# Patient Record
Sex: FEMALE | Race: WHITE | NOT HISPANIC OR LATINO | Employment: FULL TIME | URBAN - METROPOLITAN AREA
[De-identification: names, ages, dates, MRNs, and addresses within clinical notes are randomized per-mention and may not be internally consistent; named-entity substitution may affect disease eponyms.]

---

## 2023-05-08 ENCOUNTER — EVALUATION (OUTPATIENT)
Dept: PHYSICAL THERAPY | Facility: CLINIC | Age: 58
End: 2023-05-08

## 2023-05-08 DIAGNOSIS — M17.11 PRIMARY OSTEOARTHRITIS OF RIGHT KNEE: Primary | ICD-10-CM

## 2023-05-08 NOTE — PROGRESS NOTES
PT Evaluation     Today's date: 2023  Patient name: Danuta Warner  : 1965  MRN: [id number]  Referring provider: Janet Barney MD  Dx:   Encounter Diagnosis     ICD-10-CM    1  Primary osteoarthritis of right knee  M17 11                Assessment  Assessment details: Danuta Warner is a 61 y o  female who presents with pain, decreased strength, decreased ROM, joint effusion, ambulatory dysfunction and balance dysfunction  Due to these impairments, patient has difficulty performing ADL's, recreational activities, work-related activities, engaging in social activities, ambulation, stair negotiation, lifting/carrying, transfers  Patient's clinical presentation is consistent with their referring diagnosis of Primary osteoarthritis of right knee  (primary encounter diagnosis)  Patient has been educated in post-op contraindications / precautions, gait training, weight bearing status, home exercise program and plan of care  Patient would benefit from skilled physical therapy services to address their aforementioned functional limitations and progress towards prior level of function and independence with home exercise program      Impairments: abnormal gait, abnormal or restricted ROM, activity intolerance, impaired balance, impaired physical strength, lacks appropriate home exercise program, pain with function, weight-bearing intolerance, poor posture  and poor body mechanics  Functional limitations: walk/stand, STS, stair negotiation, squat, [place], sitting for work/driving   Prognosis: good  Prognosis details: + factors: high motivation levels  - factors: chronic pain    Goals  Short Term Goals to be accomplished by 23:  STG1: Pt will demonstrate 5/5 strength in R knee to maximize strength prior to surgery            Plan  Plan details: HEP development, stretching, strengthening, A/AA/PROM, joint mobilizations, posture education, STM/MI as needed to reduce muscle tension, muscle reeducation, PLOC discussed and agreed upon with patient  Patient would benefit from: PT eval and skilled physical therapy  Planned modality interventions: cryotherapy, thermotherapy: hydrocollator packs and unattended electrical stimulation  Planned therapy interventions: manual therapy, neuromuscular re-education, self care, therapeutic activities, therapeutic exercise, home exercise program, patient education, joint mobilization, balance, strengthening, stretching and therapeutic training  Frequency: 2x week  Duration in weeks: 12  Plan of Care beginning date: 2023  Plan of Care expiration date: 2023  Treatment plan discussed with: patient        Subjective Evaluation    History of Present Illness  Mechanism of injury: Pt is a 60 y/o female who presents to PT for pre op R TKA to be performed on 23 with Dr José Antnoio Huizar  She has had chronic R knee pain, however this time last year the pain started getting significantly worse  Has tried series of injections of all types  Currently taking Celebrex due to pain  Recreation: [place]  Work: prolonged sit, 45 min drive commute  Pain  Current pain rating: 3  At best pain rating: 3  At worst pain ratin  Location: R knee   Quality: discomfort, knife-like, sharp, throbbing, tight, pressure and grinding  Relieving factors: relaxation and rest  Aggravating factors: standing, walking, stair climbing, lifting and sitting    Treatments  Current treatment: physical therapy  Patient Goals  Patient goals for therapy: increased motion, improved balance, decreased pain, decreased edema, increased strength, independence with ADLs/IADLs, return to sport/leisure activities and return to work  Patient goal: walk/stand, STS, stair negotiation, squat, [place], sitting for work/driving        Objective     Observations     Right Knee   Positive for edema       Tenderness     Additional Tenderness Details  TTP over R ITB, adductors, HS    Active Range of Motion Left Knee   Flexion: 125 degrees   Extension: 0 degrees     Right Knee   Flexion: 110 degrees with pain  Extension: 0 degrees with pain    Passive Range of Motion   Left Knee   Flexion: 125 degrees   Extension: 0 degrees     Right Knee   Flexion: 115 degrees with pain  Extension: 0 degrees with pain    Strength/Myotome Testing     Left Knee   Flexion: 5  Extension: 5  Quadriceps contraction: good    Right Knee   Flexion: 4-  Extension: 4-  Quadriceps contraction: fair    Additional Strength Details  U/l heel raise:  R: 10 reps  L: 20 reps     Tests     Right Knee   Negative valgus stress test at 30 degrees and varus stress test at 30 degrees       Additional Tests Details  SLS: NOT TESTED   R: sec  L: sec         Precautions:   Severe R knee OA      Manuals 5/8        STM add, ITB, HS, quad, calf         PROM stretch                            Neuro Re-Ed 5/8                                                                       Ther Ex 5/8        SLR 2x10        Bridge 2x10        S/l hip abd 2x10        U/l heel raise 2x10                                             Ther Activity 5/8

## 2023-05-08 NOTE — LETTER
May 8, 2023    Rosamaria Davenport MD  [address] [place] [address]    Patient: Betsy Hartmann   YOB: 1965   Date of Visit: 2023     Encounter Diagnosis     ICD-10-CM    1  Primary osteoarthritis of right knee  M17 11           Dear Dr Rudy Thompson: Thank you for your recent referral of Betsy Hartmann  Please review the attached evaluation summary from Jessica's recent visit  Please verify that you agree with the plan of care by signing the attached order  If you have any questions or concerns, please do not hesitate to call  I sincerely appreciate the opportunity to share in the care of one of your patients and hope to have another opportunity to work with you in the near future  Sincerely,    Terry Woodard, PT      Referring Provider:      I certify that I have read the below Plan of Care and certify the need for these services furnished under this plan of treatment while under my care  Rosamaria Davenport MD  [address] [place] [address],[address]  Via Fax: [phone]          PT Evaluation     Today's date: 2023  Patient name: Betsy Hartmann  : 1965  MRN: [id number]  Referring provider: Robin Bowser MD  Dx:   Encounter Diagnosis     ICD-10-CM    1  Primary osteoarthritis of right knee  M17 11                Assessment  Assessment details: Betsy Hartmann is a 61 y o  female who presents with pain, decreased strength, decreased ROM, joint effusion, ambulatory dysfunction and balance dysfunction  Due to these impairments, patient has difficulty performing ADL's, recreational activities, work-related activities, engaging in social activities, ambulation, stair negotiation, lifting/carrying, transfers  Patient's clinical presentation is consistent with their referring diagnosis of Primary osteoarthritis of right knee  (primary encounter diagnosis)   Patient has been educated in post-op contraindications / precautions, gait training, weight bearing status, home exercise program and plan of care  Patient would benefit from skilled physical therapy services to address their aforementioned functional limitations and progress towards prior level of function and independence with home exercise program      Impairments: abnormal gait, abnormal or restricted ROM, activity intolerance, impaired balance, impaired physical strength, lacks appropriate home exercise program, pain with function, weight-bearing intolerance, poor posture  and poor body mechanics  Functional limitations: walk/stand, STS, stair negotiation, squat, [place], sitting for work/driving   Prognosis: good  Prognosis details: + factors: high motivation levels  - factors: chronic pain    Goals  Short Term Goals to be accomplished by 6/7/23:  STG1: Pt will demonstrate 5/5 strength in R knee to maximize strength prior to surgery  Plan  Plan details: HEP development, stretching, strengthening, A/AA/PROM, joint mobilizations, posture education, STM/MI as needed to reduce muscle tension, muscle reeducation, PLOC discussed and agreed upon with patient  Patient would benefit from: PT eval and skilled physical therapy  Planned modality interventions: cryotherapy, thermotherapy: hydrocollator packs and unattended electrical stimulation  Planned therapy interventions: manual therapy, neuromuscular re-education, self care, therapeutic activities, therapeutic exercise, home exercise program, patient education, joint mobilization, balance, strengthening, stretching and therapeutic training  Frequency: 2x week  Duration in weeks: 12  Plan of Care beginning date: 5/8/2023  Plan of Care expiration date: 7/31/2023  Treatment plan discussed with: patient        Subjective Evaluation    History of Present Illness  Mechanism of injury: Pt is a 60 y/o female who presents to PT for pre op R TKA to be performed on 6/6/23 with Dr Daniella Uriarte   She has had chronic R knee pain, however this time last year the pain started getting significantly worse  Has tried series of injections of all types  Currently taking Celebrex due to pain  Recreation: [place]  Work: prolonged sit, 45 min drive commute  Pain  Current pain rating: 3  At best pain rating: 3  At worst pain ratin  Location: R knee   Quality: discomfort, knife-like, sharp, throbbing, tight, pressure and grinding  Relieving factors: relaxation and rest  Aggravating factors: standing, walking, stair climbing, lifting and sitting    Treatments  Current treatment: physical therapy  Patient Goals  Patient goals for therapy: increased motion, improved balance, decreased pain, decreased edema, increased strength, independence with ADLs/IADLs, return to sport/leisure activities and return to work  Patient goal: walk/stand, STS, stair negotiation, squat, [place], sitting for work/driving        Objective     Observations     Right Knee   Positive for edema  Tenderness     Additional Tenderness Details  TTP over R ITB, adductors, HS    Active Range of Motion   Left Knee   Flexion: 125 degrees   Extension: 0 degrees     Right Knee   Flexion: 110 degrees with pain  Extension: 0 degrees with pain    Passive Range of Motion   Left Knee   Flexion: 125 degrees   Extension: 0 degrees     Right Knee   Flexion: 115 degrees with pain  Extension: 0 degrees with pain    Strength/Myotome Testing     Left Knee   Flexion: 5  Extension: 5  Quadriceps contraction: good    Right Knee   Flexion: 4-  Extension: 4-  Quadriceps contraction: fair    Additional Strength Details  U/l heel raise:  R: 10 reps  L: 20 reps     Tests     Right Knee   Negative valgus stress test at 30 degrees and varus stress test at 30 degrees       Additional Tests Details  SLS: NOT TESTED   R: sec  L: sec        Precautions:   Severe R knee OA      Manuals 8        STM add, ITB, HS, quad, calf         PROM stretch Neuro Re-Ed 5/8                                                                       Ther Ex 5/8        SLR 2x10        Bridge 2x10        S/l hip abd 2x10        U/l heel raise 2x10                                             Ther Activity 5/8

## 2023-06-08 ENCOUNTER — EVALUATION (OUTPATIENT)
Dept: PHYSICAL THERAPY | Facility: CLINIC | Age: 58
End: 2023-06-08
Payer: COMMERCIAL

## 2023-06-08 DIAGNOSIS — G89.29 CHRONIC PAIN OF RIGHT KNEE: Primary | ICD-10-CM

## 2023-06-08 DIAGNOSIS — R26.2 DIFFICULTY WALKING: ICD-10-CM

## 2023-06-08 DIAGNOSIS — M25.561 CHRONIC PAIN OF RIGHT KNEE: Primary | ICD-10-CM

## 2023-06-08 DIAGNOSIS — Z96.651 STATUS POST RIGHT KNEE REPLACEMENT: ICD-10-CM

## 2023-06-08 DIAGNOSIS — M17.11 OSTEOARTHRITIS OF RIGHT KNEE, UNSPECIFIED OSTEOARTHRITIS TYPE: ICD-10-CM

## 2023-06-08 PROCEDURE — 97110 THERAPEUTIC EXERCISES: CPT | Performed by: PHYSICAL THERAPIST

## 2023-06-08 PROCEDURE — 97161 PT EVAL LOW COMPLEX 20 MIN: CPT | Performed by: PHYSICAL THERAPIST

## 2023-06-08 NOTE — LETTER
2023    Rory De La Torre MD  [address] [place] [address]    Patient: Maday Ivy   YOB: 1965   Date of Visit: 2023     Encounter Diagnosis     ICD-10-CM    1  Chronic pain of right knee  M25 561     G89 29       2  Osteoarthritis of right knee, unspecified osteoarthritis type  M17 11       3  Status post right knee replacement  Z96 651       4  Difficulty walking  R26 2           Dear Dr Bazan Nim: Thank you for your recent referral of Maday Ivy  Please review the attached evaluation summary from Jessica's recent visit  Please verify that you agree with the plan of care by signing the attached order  If you have any questions or concerns, please do not hesitate to call  I sincerely appreciate the opportunity to share in the care of one of your patients and hope to have another opportunity to work with you in the near future  Sincerely,    Giovanni Nguyen, PT      Referring Provider:      I certify that I have read the below Plan of Care and certify the need for these services furnished under this plan of treatment while under my care  Rory De La Torre MD  [address] [place] [address]  Via Fax: [phone]          PT Evaluation     Today's date: 2023  Patient name: Maday Ivy  : 1965  MRN: [id number]  Referring provider: Barb Purvis MD  Dx:   Encounter Diagnosis     ICD-10-CM    1  Chronic pain of right knee  M25 561     G89 29       2  Osteoarthritis of right knee, unspecified osteoarthritis type  M17 11       3  Status post right knee replacement  Z96 651       4  Difficulty walking  R26 2           Start Time: 1130  Stop Time: 1215  Total time in clinic (min): 45 minutes    Assessment/Plan     Maday Ivy is a 61 y o  female who was referred to physical therapy for management of pain/dysfunction s/p R TKA    She presents with expected post-surgical deficits including poor quad activation, decreased knee ROM, and report of pain  Consequently, patient has difficulty completing all WBing ADLs  Barb Scott would benefit from skilled intervention to address all deficits and improve functional capability  Patient is a good candidate for therapy, pending compliance with HEP and 2x weekly participation  Thank you for the referral and please do not hesitate to contact me with any questions or concerns regarding Jessica’s care! Plan  Frequency: 2x per week    Duration in weeks: 12  POC start date: 6/8/23  POC end date: 8/31/23   Therapeutic exercise/activity, neuromuscular reeducation, manual therapy, and modalities  Patient understands and agrees to plan of care  Goals  Short Term--4 weeks  1  2 point decrease in pain levels  2  Patient will demonstrate a palpable quad contraction with observable superior patellar translation and minimal to no gluteal compression  3  Knee ROM 0-120  4  Patient will be independent with HEP  Long Term--By Discharge  1  Patient will achieve expected FOTO score  2  Patient will demonstrate normalized gait pattern with no ambulatory limitation  3  Patient will safely navigate flight of steps utilizing reciprocal step pattern and no use of HHA  4  LE strength > 4+/5 in all planes    Patient's Goal: Be able to ride her bike outside        Subjective     History   Date of Surgery: 6/6/23 Description: R TKA  Follow up with surgeon on 6/23/23  Symptoms  Generalized achiness and stiffness in both anterior/posterior knee and quad muscle  Patient is currently taking prescription opioid, Celebrex, and OTC tylenol  Pain at best: 4  Pain at worst: [address] lives with her  in a home with stairs to enter and a basement  Patient is a  for [employer]  Patient works from home 2 days per week and is in the office for 3 days  Commute is 1 5 hours round trip        Objective  Flowsheet Rows Flowsheet Row Most Recent Value   PT/OT G-Codes    Current Score 40   Projected Score 62         GAIT: Step-through gait pattern with use of RW    Squat assess: TBA  SLS: RLE: TBA, LLE: TBA    Knee A/PROM:  Right = 0-15-76    0-10-81       Left =  10-0-133    Quad activation = fair, observable contraction with min glute compensation           MMT    Hip         R          L   Flex  Extn  Abd  MMT    Knee      R          L   Flex  Extn  MMT    Ankle         R          L   PF     DF  Incision inspection = dressing covering entire incision present and intact; no seepage present; patient denies constitutional symptoms          Precautions: IBS  Access Code: [email or web address]  URL: [email or web address]  Date: 06/08/2023  Prepared by: Halle Portillo    Exercises  - Seated Heel Slide  - 5 x daily - 10 reps - 10 sec hold  - Supine Heel Slide with Strap  - 5 x daily - 10 reps - 10 sec hold  - Seated Quad Set  - 5 x daily - 2 sets - 10 reps - 5 sec hold  - Supine Quad Set  - 5 x daily - 2 sets - 10 reps - 5 sec hold  - Seated Long Arc Quad  - 1 x daily - 2 sets - 10 reps - 5 hold      Manuals 6/8            Knee PROM JAB            Patellar mobs                                       Neuro Re-Ed             Quad sets             TKE                                                                              Ther Ex             HEP 8'            Bike AAROM             Heel slides             gastroc stretch c strap                          Standing flex stretch on step             Mini squats             HR             Ther Activity             Ant step ups             Ant step downs             Leg Press                          Gait Training             SPC amb                          Modalities             Ice post 10'

## 2023-06-08 NOTE — PROGRESS NOTES
PT Evaluation     Today's date: 2023  Patient name: Gonzalez Jimenez  : 1965  MRN: [id number]  Referring provider: Avril Miller MD  Dx:   Encounter Diagnosis     ICD-10-CM    1  Chronic pain of right knee  M25 561     G89 29       2  Osteoarthritis of right knee, unspecified osteoarthritis type  M17 11       3  Status post right knee replacement  Z96 651       4  Difficulty walking  R26 2           Start Time: 1130  Stop Time: 1215  Total time in clinic (min): 45 minutes    Assessment/Plan     Gonzalez Jimenez is a 61 y o  female who was referred to physical therapy for management of pain/dysfunction s/p R TKA  She presents with expected post-surgical deficits including poor quad activation, decreased knee ROM, and report of pain  Consequently, patient has difficulty completing all WBing ADLs  Drew Monzon would benefit from skilled intervention to address all deficits and improve functional capability  Patient is a good candidate for therapy, pending compliance with HEP and 2x weekly participation  Thank you for the referral and please do not hesitate to contact me with any questions or concerns regarding Jessica’s care! Plan  Frequency: 2x per week    Duration in weeks: 12  POC start date: 23  POC end date: 23   Therapeutic exercise/activity, neuromuscular reeducation, manual therapy, and modalities  Patient understands and agrees to plan of care  Goals  Short Term--4 weeks  1  2 point decrease in pain levels  2  Patient will demonstrate a palpable quad contraction with observable superior patellar translation and minimal to no gluteal compression  3  Knee ROM 0-120  4  Patient will be independent with HEP  Long Term--By Discharge  1  Patient will achieve expected FOTO score  2  Patient will demonstrate normalized gait pattern with no ambulatory limitation  3  Patient will safely navigate flight of steps utilizing reciprocal step pattern and no use of HHA  4   LE strength > 4+/5 in all planes    Patient's Goal: Be able to ride her bike outside        Subjective     History   Date of Surgery: 6/6/23 Description: R TKA  Follow up with surgeon on 6/23/23  Symptoms  Generalized achiness and stiffness in both anterior/posterior knee and quad muscle  Patient is currently taking prescription opioid, Celebrex, and OTC tylenol  Pain at best: 4  Pain at worst: [address] lives with her  in a home with stairs to enter and a basement  Patient is a  for [employer]  Patient works from home 2 days per week and is in the office for 3 days  Commute is 1 5 hours round trip  Objective  Flowsheet Rows    Flowsheet Row Most Recent Value   PT/OT G-Codes    Current Score 40   Projected Score 62         GAIT: Step-through gait pattern with use of RW    Squat assess: TBA  SLS: RLE: TBA, LLE: TBA    Knee A/PROM:  Right = 0-15-76    0-10-81       Left =  10-0-133    Quad activation = fair, observable contraction with min glute compensation           MMT    Hip         R          L   Flex  Extn  Abd  MMT    Knee      R          L   Flex  Extn  MMT    Ankle         R          L   PF     DF  Incision inspection = dressing covering entire incision present and intact; no seepage present; patient denies constitutional symptoms           Precautions: IBS  Access Code: [email or web address]  URL: [email or web address]  Date: 06/08/2023  Prepared by: Halle Portillo    Exercises  - Seated Heel Slide  - 5 x daily - 10 reps - 10 sec hold  - Supine Heel Slide with Strap  - 5 x daily - 10 reps - 10 sec hold  - Seated Quad Set  - 5 x daily - 2 sets - 10 reps - 5 sec hold  - Supine Quad Set  - 5 x daily - 2 sets - 10 reps - 5 sec hold  - Seated Long Arc Quad  - 1 x daily - 2 sets - 10 reps - 5 hold      Manuals 6/8            Knee PROM JAB            Patellar mobs Neuro Re-Ed             [employer]                                                                              Ther Ex             HEP 8'            Bike AAROM             Heel slides             gastroc stretch c strap                          Standing flex stretch on step             Mini squats             HR             Ther Activity             Ant step ups             Ant step downs             Leg Press                          Gait Training             SPC amb                          Modalities             Ice post 10'

## 2023-06-12 ENCOUNTER — OFFICE VISIT (OUTPATIENT)
Dept: PHYSICAL THERAPY | Facility: CLINIC | Age: 58
End: 2023-06-12
Payer: COMMERCIAL

## 2023-06-12 DIAGNOSIS — Z96.651 STATUS POST RIGHT KNEE REPLACEMENT: ICD-10-CM

## 2023-06-12 DIAGNOSIS — M17.11 OSTEOARTHRITIS OF RIGHT KNEE, UNSPECIFIED OSTEOARTHRITIS TYPE: ICD-10-CM

## 2023-06-12 DIAGNOSIS — R26.2 DIFFICULTY WALKING: ICD-10-CM

## 2023-06-12 DIAGNOSIS — M25.561 CHRONIC PAIN OF RIGHT KNEE: Primary | ICD-10-CM

## 2023-06-12 DIAGNOSIS — G89.29 CHRONIC PAIN OF RIGHT KNEE: Primary | ICD-10-CM

## 2023-06-12 PROCEDURE — 97110 THERAPEUTIC EXERCISES: CPT

## 2023-06-12 PROCEDURE — 97140 MANUAL THERAPY 1/> REGIONS: CPT

## 2023-06-12 NOTE — PROGRESS NOTES
Daily Note     Today's date: 2023  Patient name: Lea Restrepo  : 1965  MRN: [id number]  Referring provider: Helena Montaño MD  Dx:   Encounter Diagnosis     ICD-10-CM    1  Chronic pain of right knee  M25 561     G89 29       2  Osteoarthritis of right knee, unspecified osteoarthritis type  M17 11       3  Status post right knee replacement  Z96 651       4  Difficulty walking  R26 2                      Subjective: Ricardo reports increase R knee pain earlier in the day, decreasing with pain medication  Pt states she has not been sleeping well the last few days, inability to fall asleep  Notes she has not been complaint with HEP daily  Objective: See treatment diary below      Assessment: Ricardo tolerated PT treatment well  STM focusing along distal quadriceps and adductors to address edema and improve tissue mobility  Passive knee stretching performed, ROM limited into flexion and extension secondary to pain  Good quadriceps activation with quad set, added SAQ to further challenge  Completed WBing exercises w/o provocation  Pt would benefit from continued PT to further address impairments and maximize functional level  Plan: Continue per plan of care  Precautions: IBS  Access Code: [email or web address]  URL: [email or web address]  Date: 2023  Prepared by: Leonela Ohs    Exercises  - Seated Heel Slide  - 5 x daily - 10 reps - 10 sec hold  - Supine Heel Slide with Strap  - 5 x daily - 10 reps - 10 sec hold  - Seated Quad Set  - 5 x daily - 2 sets - 10 reps - 5 sec hold  - Supine Quad Set  - 5 x daily - 2 sets - 10 reps - 5 sec hold  - Seated Long Arc Quad  - 1 x daily - 2 sets - 10 reps - 5 hold      Manuals        Knee PROM JAB JW       Patellar mobs         STM   Quad JW                Neuro Re-Ed         Quad sets  :05x20        TKE                                                      Ther Ex         HEP 8'        Bike AAROM         Heel slides  :10x10 gastroc stretch c strap         SAQ  2x10        HR         Standing flex stretch on step         Standing hip abduction   2x10 ea        Mini squats         HR  3x10        Ther Activity         Ant step ups         Ant step downs         Leg Press                  Gait Training         SPC amb                  Modalities         Ice post 10'

## 2023-06-14 ENCOUNTER — OFFICE VISIT (OUTPATIENT)
Dept: PHYSICAL THERAPY | Facility: CLINIC | Age: 58
End: 2023-06-14
Payer: COMMERCIAL

## 2023-06-14 DIAGNOSIS — G89.29 CHRONIC PAIN OF RIGHT KNEE: Primary | ICD-10-CM

## 2023-06-14 DIAGNOSIS — R26.2 DIFFICULTY WALKING: ICD-10-CM

## 2023-06-14 DIAGNOSIS — M17.11 OSTEOARTHRITIS OF RIGHT KNEE, UNSPECIFIED OSTEOARTHRITIS TYPE: ICD-10-CM

## 2023-06-14 DIAGNOSIS — Z96.651 STATUS POST RIGHT KNEE REPLACEMENT: ICD-10-CM

## 2023-06-14 DIAGNOSIS — M25.561 CHRONIC PAIN OF RIGHT KNEE: Primary | ICD-10-CM

## 2023-06-14 PROCEDURE — 97140 MANUAL THERAPY 1/> REGIONS: CPT

## 2023-06-14 PROCEDURE — 97110 THERAPEUTIC EXERCISES: CPT

## 2023-06-14 NOTE — PROGRESS NOTES
Daily Note     Today's date: 2023  Patient name: Jordan Stratton  : 1965  MRN: [id number]  Referring provider: Jacinda Duff MD  Dx:   Encounter Diagnosis     ICD-10-CM    1  Chronic pain of right knee  M25 561     G89 29       2  Osteoarthritis of right knee, unspecified osteoarthritis type  M17 11       3  Status post right knee replacement  Z96 651       4  Difficulty walking  R26 2                      Subjective: Ricardo reports R knee soreness following last PT session, CP and pain medication used to decrease symptoms  Pt states she has been having GI upset with prescribed pain medication, following up with MD  Notes she is no longer using AD with ambulation  Objective: See treatment diary below      Assessment: Ricardo tolerated PT treatment well  STM focusing along distal quadriceps, adductors, and gastroc to address edema and improve tissue mobility  Continued with passive knee stretching to improved flexion and extension ROM  Pt with good quad activation, LAQ added to further challenge  STS added to diary to address functional strength,1 foam A required  Pt would benefit from continued PT to further address impairments and maximize functional level  Plan: Continue per plan of care  Precautions: IBS  Access Code: [email or web address]  URL: [email or web address]  Date: 2023  Prepared by: Loren Acevedo    Exercises  - Seated Heel Slide  - 5 x daily - 10 reps - 10 sec hold  - Supine Heel Slide with Strap  - 5 x daily - 10 reps - 10 sec hold  - Seated Quad Set  - 5 x daily - 2 sets - 10 reps - 5 sec hold  - Supine Quad Set  - 5 x daily - 2 sets - 10 reps - 5 sec hold  - Seated Long Arc Quad  - 1 x daily - 2 sets - 10 reps - 5 hold      Manuals       Knee PROM JAB KHALIDA JW      Patellar mobs         STM   Izzy GAXIOLA               Neuro Re-Ed        Quad sets  :05x20  :05x20       TKE                                                      Ther Ex   HEP 8'        Bike AAROM         Heel slides  :10x10 :10x10       gastroc stretch c strap         SAQ  2x10  2x10       LAQ   2x10      HR         Standing hip abduction   2x10 ea  2x10       Mini squats         HR  3x10  3x10       Ther Activity  6/12 6/14      step ups          step downs         Leg Press         STS   x10                Modalities   6/14      Ice post 10'

## 2023-06-19 ENCOUNTER — OFFICE VISIT (OUTPATIENT)
Dept: PHYSICAL THERAPY | Facility: CLINIC | Age: 58
End: 2023-06-19
Payer: COMMERCIAL

## 2023-06-19 DIAGNOSIS — M17.11 OSTEOARTHRITIS OF RIGHT KNEE, UNSPECIFIED OSTEOARTHRITIS TYPE: ICD-10-CM

## 2023-06-19 DIAGNOSIS — R26.2 DIFFICULTY WALKING: ICD-10-CM

## 2023-06-19 DIAGNOSIS — M25.561 CHRONIC PAIN OF RIGHT KNEE: Primary | ICD-10-CM

## 2023-06-19 DIAGNOSIS — Z96.651 STATUS POST RIGHT KNEE REPLACEMENT: ICD-10-CM

## 2023-06-19 DIAGNOSIS — G89.29 CHRONIC PAIN OF RIGHT KNEE: Primary | ICD-10-CM

## 2023-06-19 PROCEDURE — 97110 THERAPEUTIC EXERCISES: CPT

## 2023-06-19 PROCEDURE — 97140 MANUAL THERAPY 1/> REGIONS: CPT

## 2023-06-19 NOTE — PROGRESS NOTES
"Daily Note     Today's date: 2023  Patient name: Ruthie Caceres  : 1965  MRN: [id number]  Referring provider: Cecile De La Garza MD  Dx:   Encounter Diagnosis     ICD-10-CM    1  Chronic pain of right knee  M25 561     G89 29       2  Osteoarthritis of right knee, unspecified osteoarthritis type  M17 11       3  Status post right knee replacement  Z96 651       4  Difficulty walking  R26 2                      Subjective: William Rudd reports increased R knee soreness \"comes and goes\" throughout the pain, mostly back of knee  Pt will have staples removed this Friday  Objective: See treatment diary below      Assessment: Ricardo tolerated PT treatment well  Initiated session with RB warm up for knee ROM  STM focusing along distal quadriceps, adductors, and gastroc to address tissue tone  ROM progressing appropriately, pt able to achieve about 95 degrees of passive flexion  Good quad strength displayed, added SLR to further challenge  Little difficulty with STS transfer from standard chair, advanced repetitions today  Pt would benefit from continued PT to further address impairments and maximize functional level  Plan: Continue per plan of care  Precautions: IBS  Access Code: [email or web address]  URL: [email or web address]  Date: 2023  Prepared by: Cordell Melendrez    Exercises  - Seated Heel Slide  - 5 x daily - 10 reps - 10 sec hold  - Supine Heel Slide with Strap  - 5 x daily - 10 reps - 10 sec hold  - Seated Quad Set  - 5 x daily - 2 sets - 10 reps - 5 sec hold  - Supine Quad Set  - 5 x daily - 2 sets - 10 reps - 5 sec hold  - Seated Long Arc Quad  - 1 x daily - 2 sets - 10 reps - 5 hold      Manuals      Knee PROM [address] [employer]     Patellar mobs         STM   Mark Salazar JW              Neuro Re-Ed       Quad sets  :05x20  :05x20  :05x20      TKE                                                      Ther Ex       HEP 8'        Heel slides  " :10x10 :10x10  :10x10      gastroc stretch c strap         SAQ  2x10  2x10  3x10 :05     LAQ   2x10 3x10 :05     SLR    2x10      HR         Standing hip abduction   2x10 ea  2x10       Mini squats         HR  3x10  3x10  3x10      Ther Activity  6/12 6/14 6/19     step ups          step downs         Leg Press         STS   x10  2x10      RB    Lvl 0 5'      Modalities   6/14 6/19     Ice post 10'

## 2023-06-21 ENCOUNTER — OFFICE VISIT (OUTPATIENT)
Dept: PHYSICAL THERAPY | Facility: CLINIC | Age: 58
End: 2023-06-21
Payer: COMMERCIAL

## 2023-06-21 DIAGNOSIS — M17.11 OSTEOARTHRITIS OF RIGHT KNEE, UNSPECIFIED OSTEOARTHRITIS TYPE: ICD-10-CM

## 2023-06-21 DIAGNOSIS — M25.561 CHRONIC PAIN OF RIGHT KNEE: Primary | ICD-10-CM

## 2023-06-21 DIAGNOSIS — R26.2 DIFFICULTY WALKING: ICD-10-CM

## 2023-06-21 DIAGNOSIS — G89.29 CHRONIC PAIN OF RIGHT KNEE: Primary | ICD-10-CM

## 2023-06-21 DIAGNOSIS — Z96.651 STATUS POST RIGHT KNEE REPLACEMENT: ICD-10-CM

## 2023-06-21 PROCEDURE — 97140 MANUAL THERAPY 1/> REGIONS: CPT

## 2023-06-21 PROCEDURE — 97110 THERAPEUTIC EXERCISES: CPT

## 2023-06-21 NOTE — PROGRESS NOTES
"Daily Note     Today's date: 2023  Patient name: Libby Godoy  : 1965  MRN: [id number]  Referring provider: Sun Reid MD  Dx:   Encounter Diagnosis     ICD-10-CM    1  Chronic pain of right knee  M25 561     G89 29       2  Osteoarthritis of right knee, unspecified osteoarthritis type  M17 11       3  Status post right knee replacement  Z96 651       4  Difficulty walking  R26 2                      Subjective: Myron Hartmann reports R knee soreness following last PT session  Pt states she is eager to have staples removed on Friday, \"I can feel them pinching me\"  Complaint with HEP  Objective: See treatment diary below      Assessment: Ricardo tolerated PT treatment well  Knee flexion and extension ROM is progressing appropriately  STM focusing along R distal quad and gastroc to address tone, TTP present  Good quad activation throughout exercises  Added LP w/o provocation  Fatigue evident post session  Pt would benefit from continued PT to further address impairments and maximize functional level  Plan: Continue per plan of care  Precautions: IBS         Manuals     Knee PROM [address]    Patellar mobs         STM   Marcial Renetta JW             Neuro Re-Ed      Quad sets  :05x20  :05x20  :05x20      TKE                                                      Ther Ex      HEP 8'        Heel slides  :10x10 :10x10  :10x10  :10x10     gastroc stretch c strap         SAQ  2x10  2x10  3x10 :05     LAQ   2x10 3x10 :05 3x10 :05    SLR    2x10  3x10     bridge     3x10     hamstring stretch      :30x3     Standing hip abduction   2x10 ea  2x10       Mini squats         HR  3x10  3x10  3x10  3x10     Ther Activity      step ups          step downs         Leg Press     50/ x10   60/ x10      STS   x10  2x10  2x10     RB    Lvl 0 5'  Lvl 0 5'     Modalities       Ice post 10'                      "

## 2023-06-26 ENCOUNTER — OFFICE VISIT (OUTPATIENT)
Dept: PHYSICAL THERAPY | Facility: CLINIC | Age: 58
End: 2023-06-26
Payer: COMMERCIAL

## 2023-06-26 DIAGNOSIS — R26.2 DIFFICULTY WALKING: ICD-10-CM

## 2023-06-26 DIAGNOSIS — Z96.651 STATUS POST RIGHT KNEE REPLACEMENT: ICD-10-CM

## 2023-06-26 DIAGNOSIS — M17.11 OSTEOARTHRITIS OF RIGHT KNEE, UNSPECIFIED OSTEOARTHRITIS TYPE: ICD-10-CM

## 2023-06-26 DIAGNOSIS — G89.29 CHRONIC PAIN OF RIGHT KNEE: Primary | ICD-10-CM

## 2023-06-26 DIAGNOSIS — M25.561 CHRONIC PAIN OF RIGHT KNEE: Primary | ICD-10-CM

## 2023-06-26 PROCEDURE — 97530 THERAPEUTIC ACTIVITIES: CPT

## 2023-06-26 PROCEDURE — 97110 THERAPEUTIC EXERCISES: CPT

## 2023-06-26 PROCEDURE — 97140 MANUAL THERAPY 1/> REGIONS: CPT

## 2023-06-26 NOTE — PROGRESS NOTES
"Daily Note     Today's date: 2023  Patient name: Casimiro Schmitt  : 1965  MRN: [id number]  Referring provider: Uriel Hodges MD  Dx:   Encounter Diagnosis     ICD-10-CM    1  Chronic pain of right knee  M25 561     G89 29       2  Osteoarthritis of right knee, unspecified osteoarthritis type  M17 11       3  Status post right knee replacement  Z96 651       4  Difficulty walking  R26 2                      Subjective: Dmitry Collins reports R knee strength and ROM are improving  Notes decreased pain since staple removal on Friday, MD pleased with current state of knee  Objective: See treatment diary below      Assessment: Ricardo tolerated PT treatment well  Knee flexion and extension ROM is progressing appropriately  Pt able to complete full revolution with RB warm up  Continued with STM to R distal quad and ITB to address tone, TTP present  Advanced LAQ with addition of ankle weight  Introduced stair navigation, pt challenged  HHA required for forward step down for eccentric control  She would benefit from continued PT to further address impairments and maximize functional level  Plan: Continue per plan of care  Precautions: IBS         Manuals    Knee PROM [facility] [address]   Patellar mobs         STM   Kelly Quinn JW            Neuro Re-Ed     Quad sets  :05x20  :05x20  :05x20   :05x10    TKE                                                      Ther Ex     HEP 8'        Heel slides  :10x10 :10x10  :10x10  :10x10     gastroc stretch c strap         SAQ  2x10  2x10  3x10 :05     LAQ   2x10 3x10 :05 3x10 :05 5# 3x10    SLR    2x10  3x10  3x10    bridge     3x10     hamstring stretch      :30x3     Standing hip abduction   2x10 ea  2x10    S/l 3x10    Mini squats         HR  3x10  3x10  3x10  3x10  3x10    Ther Activity     step ups      6\" 2x10     step downs      2\" x10   4\" x10   1 HHA  " Leg Press     50/ x10   60/ x10      STS   x10  2x10  2x10  3x10    RB    Lvl 0 5'  Lvl 0 5'  Lvl 0 5'    Modalities   6/14 6/19 6/21 6/26   Ice post 10'

## 2023-06-28 ENCOUNTER — OFFICE VISIT (OUTPATIENT)
Dept: PHYSICAL THERAPY | Facility: CLINIC | Age: 58
End: 2023-06-28
Payer: COMMERCIAL

## 2023-06-28 DIAGNOSIS — G89.29 CHRONIC PAIN OF RIGHT KNEE: Primary | ICD-10-CM

## 2023-06-28 DIAGNOSIS — Z96.651 STATUS POST RIGHT KNEE REPLACEMENT: ICD-10-CM

## 2023-06-28 DIAGNOSIS — R26.2 DIFFICULTY WALKING: ICD-10-CM

## 2023-06-28 DIAGNOSIS — M25.561 CHRONIC PAIN OF RIGHT KNEE: Primary | ICD-10-CM

## 2023-06-28 DIAGNOSIS — M17.11 OSTEOARTHRITIS OF RIGHT KNEE, UNSPECIFIED OSTEOARTHRITIS TYPE: ICD-10-CM

## 2023-06-28 PROCEDURE — 97110 THERAPEUTIC EXERCISES: CPT

## 2023-06-28 PROCEDURE — 97140 MANUAL THERAPY 1/> REGIONS: CPT

## 2023-06-28 PROCEDURE — 97530 THERAPEUTIC ACTIVITIES: CPT

## 2023-06-28 NOTE — PROGRESS NOTES
"Daily Note     Today's date: 2023  Patient name: Casimiro Schmitt  : 1965  MRN: [id number]  Referring provider: Uriel Hodges MD  Dx:   Encounter Diagnosis     ICD-10-CM    1  Chronic pain of right knee  M25 561     G89 29       2  Osteoarthritis of right knee, unspecified osteoarthritis type  M17 11       3  Status post right knee replacement  Z96 651       4  Difficulty walking  R26 2                      Subjective: Dmitry Collins reports some soreness following last PT session  Pt noting improvement in knee ROM and strength, \"I am starting to forget about my knee\"  Objective: See treatment diary below      Assessment: Ricardo tolerated PT treatment well  Knee flexion and extension ROM is progressing appropriately  Improved tissue tone palpated throughout R distal quad with STM  Advanced LP to u/l, fatiguing at 50 lbs  Continue to progress functional strength  Pt would benefit from continued PT to further address impairments and maximize functional level  Plan: Continue per plan of care  Precautions: IBS         Manuals    Knee PROM [address] [facility]   Patellar mobs         STM  [address] [facility]            Neuro Re-Ed    Quad sets   :05x20  :05x20   :05x10    TKE                                                      Ther Ex    HEP         Heel slides :10x10   :10x10  :10x10  :10x10     gastroc stretch c strap         SAQ   2x10  3x10 :05     LAQ 5# 3x10   2x10 3x10 :05 3x10 :05 5# 3x10    SLR 3x10   2x10  3x10  3x10    bridge 3x10     3x10     hamstring stretch      :30x3     Standing hip abduction  S/l 3x10   2x10    S/l 3x10    Mini squats         HR 3x10   3x10  3x10  3x10  3x10    Ther Activity    step ups      6\" 2x10     step downs      2\" x10   4\" x10   1 HHA    Leg Press U/l 30/ x10   40/ x10   50/ x10       50/ x10   60/ x10      STS 3x10  x10  2x10  2x10  3x10    RB Lvl 0 5'    Lvl 0 5'  Lvl " 0 5'  Lvl 0 5'    Modalities 6/28 6/14 6/19 6/21 6/26   Ice post

## 2023-07-03 ENCOUNTER — OFFICE VISIT (OUTPATIENT)
Dept: PHYSICAL THERAPY | Facility: CLINIC | Age: 58
End: 2023-07-03
Payer: COMMERCIAL

## 2023-07-03 DIAGNOSIS — R26.2 DIFFICULTY WALKING: Primary | ICD-10-CM

## 2023-07-03 DIAGNOSIS — M25.561 CHRONIC PAIN OF RIGHT KNEE: ICD-10-CM

## 2023-07-03 DIAGNOSIS — Z96.651 STATUS POST RIGHT KNEE REPLACEMENT: ICD-10-CM

## 2023-07-03 DIAGNOSIS — G89.29 CHRONIC PAIN OF RIGHT KNEE: ICD-10-CM

## 2023-07-03 DIAGNOSIS — M17.11 OSTEOARTHRITIS OF RIGHT KNEE, UNSPECIFIED OSTEOARTHRITIS TYPE: ICD-10-CM

## 2023-07-03 PROCEDURE — 97110 THERAPEUTIC EXERCISES: CPT | Performed by: PHYSICAL THERAPIST

## 2023-07-03 PROCEDURE — 97140 MANUAL THERAPY 1/> REGIONS: CPT | Performed by: PHYSICAL THERAPIST

## 2023-07-03 PROCEDURE — 97530 THERAPEUTIC ACTIVITIES: CPT | Performed by: PHYSICAL THERAPIST

## 2023-07-03 NOTE — PROGRESS NOTES
Daily Note     Today's date: 7/3/2023  Patient name: Jossue Quach  : 1965  MRN: [id number]  Referring provider: Timothy Mcdaniel MD  Dx:   Encounter Diagnosis     ICD-10-CM    1. Difficulty walking  R26.2       2. Chronic pain of right knee  M25.561     G89.29       3. Osteoarthritis of right knee, unspecified osteoarthritis type  M17.11       4. Status post right knee replacement  Z96.651              Subjective: Pt reported that "I am getting better, but I am still feeling muscular fatigue and some pain in the bottom of my feet has started."     Objective: See treatment diary below    Assessment: Tolerated treatment well. Patient demonstrated fatigue post treatment, exhibited good technique with therapeutic exercises and would benefit from continued PT. Plan: Continue per plan of care. Precautions: IBS.        Manuals 6/28 7/3  6/19 6/21 6/26   Knee PROM [address]   Patellar mobs         STM  JW JZ- R post tib, soleus,gastroc   JW JW JW            Neuro Re-Ed 6/28 7/3  6/19 6/21 6/26   Quad sets    :05x20   :05x10    TKE                                                      Ther Ex 6/28 7/3  6/19 6/21 6/26   HEP         Heel slides :10x10    :10x10  :10x10     gastroc stretch step  :30x3 ea       Soleus stretch incline  :30x3 ea       SAQ    3x10 :05     LAQ 5# 3x10    3x10 :05 3x10 :05 5# 3x10    SLR 3x10   2x10  3x10  3x10    bridge 3x10     3x10     Seated knee extension stretch      :30x3     Standing hip abduction  S/l 3x10      S/l 3x10    Mini squats         HR  3x10  U/l 3x10 ea  3x10  3x10  3x10    Plantar fascia stretch standing   :30x3 ea                         Ther Activity 6/28 7/3  6/19 6/21 6/26   step ups      6" 2x10     step downs      2" x10   4" x10   1 HHA    Leg Press U/l 30/ x10   40/ x10   50/ x10    seat4 U/l   40/3x10 ea   50/ x10   60/ x10      U/l calf press  30/ 3x10        STS 3x10   2x10  2x10  3x10    RB Lvl 0 5'  lvl0 3'   Lvl 0 5'  Lvl 0 5'  Lvl 0 5'

## 2023-07-05 ENCOUNTER — OFFICE VISIT (OUTPATIENT)
Dept: PHYSICAL THERAPY | Facility: CLINIC | Age: 58
End: 2023-07-05
Payer: COMMERCIAL

## 2023-07-05 DIAGNOSIS — M25.561 CHRONIC PAIN OF RIGHT KNEE: ICD-10-CM

## 2023-07-05 DIAGNOSIS — G89.29 CHRONIC PAIN OF RIGHT KNEE: ICD-10-CM

## 2023-07-05 DIAGNOSIS — R26.2 DIFFICULTY WALKING: Primary | ICD-10-CM

## 2023-07-05 DIAGNOSIS — Z96.651 STATUS POST RIGHT KNEE REPLACEMENT: ICD-10-CM

## 2023-07-05 DIAGNOSIS — M17.11 OSTEOARTHRITIS OF RIGHT KNEE, UNSPECIFIED OSTEOARTHRITIS TYPE: ICD-10-CM

## 2023-07-05 PROCEDURE — 97110 THERAPEUTIC EXERCISES: CPT

## 2023-07-05 PROCEDURE — 97530 THERAPEUTIC ACTIVITIES: CPT

## 2023-07-05 NOTE — PROGRESS NOTES
Daily Note     Today's date: 2023  Patient name: Rae Carrillo  : 1965  MRN: [id number]  Referring provider: Doug Rodriguez MD  Dx:   Encounter Diagnosis     ICD-10-CM    1. Difficulty walking  R26.2       2. Chronic pain of right knee  M25.561     G89.29       3. Osteoarthritis of right knee, unspecified osteoarthritis type  M17.11       4. Status post right knee replacement  Z96.651              Subjective: Belinda Christine reports calf and shin soreness following last PT session. Notes she still feels "weak" when standing at times. Objective: See treatment diary below    Assessment: Belinda Christine tolerated PT treatment well. Knee ROM is progressing approprietly at this time, tightness remaining into end rages. Advanced quad and glute strengthening with ankle and u/l variation, pt challenged. Completed increased weight on u/l leg press w/o provocation. PF weakness remains evident, continue to address. Pt would benefit from continued PT to further address impairments and maximize functional level. Plan: Continue per plan of care. Precautions: IBS.        Manuals 6/28 7/3 7/5  6/21 6/26   Knee PROM JW JZ JW  JW JW   Patellar mobs         STM  JW JZ- R post tib, soleus,gastroc    JW JW            Neuro Re-Ed 6/28 7/3 7/5  6/21 6/26   Quad sets      :05x10    TKE                                                      Ther Ex 6/28 7/3 7/5  6/21 6/26   Heel slides :10x10     :10x10     gastroc stretch step  :30x3 ea :30x3 ea      Soleus stretch incline  :30x3 ea :30x3 ea      SAQ         LAQ 5# 3x10   5# 3x10   3x10 :05 5# 3x10    SLR 3x10  2# 3x10   3x10  3x10    bridge 3x10   U/l 2x10 ea   3x10     Seated knee extension stretch      :30x3     Standing hip abduction  S/l 3x10   S/l 3x10 ea    S/l 3x10    Mini squats         HR  3x10  U/l 3x10 ea U/l 3x10   3x10  3x10    Plantar fascia stretch standing   :30x3 ea                         Ther Activity 6/28 7/3 7/5  6/21 6/26   step ups      6" 2x10     step downs      2" x10   4" x10   1 HHA    Leg Press U/l 30/ x10   40/ x10   50/ x10    seat4 U/l   40/3x10 ea Seat 4   50/ 2x10   60/ x10   50/ x10   60/ x10      U/l calf press  30/ 3x10  30/ 3x10       STS 3x10    2x10  3x10    RB Lvl 0 5'  lvl0 3'  Lvl 1 5'   Lvl 0 5'  Lvl 0 5'

## 2023-07-10 ENCOUNTER — OFFICE VISIT (OUTPATIENT)
Dept: PHYSICAL THERAPY | Facility: CLINIC | Age: 58
End: 2023-07-10
Payer: COMMERCIAL

## 2023-07-10 DIAGNOSIS — G89.29 CHRONIC PAIN OF RIGHT KNEE: ICD-10-CM

## 2023-07-10 DIAGNOSIS — R26.2 DIFFICULTY WALKING: Primary | ICD-10-CM

## 2023-07-10 DIAGNOSIS — M25.561 CHRONIC PAIN OF RIGHT KNEE: ICD-10-CM

## 2023-07-10 DIAGNOSIS — M17.11 OSTEOARTHRITIS OF RIGHT KNEE, UNSPECIFIED OSTEOARTHRITIS TYPE: ICD-10-CM

## 2023-07-10 PROCEDURE — 97110 THERAPEUTIC EXERCISES: CPT

## 2023-07-10 PROCEDURE — 97530 THERAPEUTIC ACTIVITIES: CPT

## 2023-07-10 NOTE — PROGRESS NOTES
Daily Note     Today's date: 7/10/2023  Patient name: Maria D Woody  : 1965  MRN: [id number]  Referring provider: Marilyn Jacques MD  Dx:   Encounter Diagnosis     ICD-10-CM    1. Difficulty walking  R26.2       2. Chronic pain of right knee  M25.561     G89.29       3. Osteoarthritis of right knee, unspecified osteoarthritis type  M17.11           Start Time: 1700  Stop Time: 1745  Total time in clinic (min): 45 minutes    Subjective: Patient states that R knee feels stiff prior to start of session. Objective: See treatment diary below      Assessment: Session initiated on bike for knee ROM and strength. Cues utilized t/o session for eccentric control. Tolerated treatment well. Patient demonstrated fatigue post treatment and would benefit from continued PT to improve R knee strength and mobility. Plan: Continue per plan of care. Precautions: IBS.        Manuals 6/28 7/3 7/5 7/10 6/21 6/26   Knee PROM JW JZ JW LQ JW JW   Patellar mobs         STM  JW JZ- R post tib, soleus,gastroc    JW JW            Neuro Re-Ed 6/28 7/3 7/5 7/10 6/21 6/26   Quad sets      :05x10    TKE                                                      Ther Ex 6/28 7/3 7/5 7/10 6/21 6/26   Heel slides :10x10     :10x10     gastroc stretch step  :30x3 ea :30x3 ea :30x3 ea     Soleus stretch incline  :30x3 ea :30x3 ea :30x3 ea     SAQ         LAQ 5# 3x10   5# 3x10  5# 3x10  3x10 :05 5# 3x10    SLR 3x10  2# 3x10  2# 3x10  3x10  3x10    bridge 3x10   U/l 2x10 ea  U/l 2x10 ea 3x10     Seated knee extension stretch      :30x3     Standing hip abduction  S/l 3x10   S/l 3x10 ea  U/l 3x10   S/l 3x10    Mini squats         HR  3x10  U/l 3x10 ea U/l 3x10  U/l 3x10  3x10  3x10    Plantar fascia stretch standing   :30x3 ea                         Ther Activity 6/28 7/3 7/5 7/10 6/21 6/26   step ups      6" 2x10     step downs      2" x10   4" x10   1 HHA    Leg Press U/l 30/ x10   40/ x10   50/ x10    seat4 U/l   40/3x10 ea Seat 4 50/ 2x10   60/ x10  Seat 4   50/ 2x10   60/ x10  50/ x10   60/ x10      U/l calf press  30/ 3x10  30/ 3x10  30/ 3x10      STS 3x10    2x10  3x10    RB Lvl 0 5'  lvl0 3'  Lvl 1 5'  Lvl 1 5'  Lvl 0 5'  Lvl 0 5'

## 2023-07-12 ENCOUNTER — OFFICE VISIT (OUTPATIENT)
Dept: PHYSICAL THERAPY | Facility: CLINIC | Age: 58
End: 2023-07-12
Payer: COMMERCIAL

## 2023-07-12 DIAGNOSIS — R26.2 DIFFICULTY WALKING: Primary | ICD-10-CM

## 2023-07-12 DIAGNOSIS — M25.561 CHRONIC PAIN OF RIGHT KNEE: ICD-10-CM

## 2023-07-12 DIAGNOSIS — M17.11 OSTEOARTHRITIS OF RIGHT KNEE, UNSPECIFIED OSTEOARTHRITIS TYPE: ICD-10-CM

## 2023-07-12 DIAGNOSIS — Z96.651 STATUS POST RIGHT KNEE REPLACEMENT: ICD-10-CM

## 2023-07-12 DIAGNOSIS — G89.29 CHRONIC PAIN OF RIGHT KNEE: ICD-10-CM

## 2023-07-12 PROCEDURE — 97140 MANUAL THERAPY 1/> REGIONS: CPT

## 2023-07-12 PROCEDURE — 97110 THERAPEUTIC EXERCISES: CPT

## 2023-07-12 NOTE — PROGRESS NOTES
Daily Note     Today's date: 2023  Patient name: Melanie Villafana  : 1965  MRN: [id number]  Referring provider: Harry Skinner MD  Dx:   Encounter Diagnosis     ICD-10-CM    1. Difficulty walking  R26.2       2. Chronic pain of right knee  M25.561     G89.29       3. Osteoarthritis of right knee, unspecified osteoarthritis type  M17.11       4. Status post right knee replacement  Z96.651                      Subjective: Patient states that she finds most difficulty with descending steps. Patient states that she was sore from LV. Objective: See treatment diary below      Assessment: Stairs re-introduced this visit. 6" challenging for step downs so 4" used which was appropriate. LP weight reduced this session 2* to objective. Heat used with LP which was effective in decrease stiffness and soreness. Tolerated treatment well. Patient demonstrated fatigue post treatment and would benefit from continued PT to strengthen R knee. Plan: Continue per plan of care. Continue with step downs but only 4". Precautions: IBS.        Manuals 6/28 7/3 7/5 7/10  6/26   Knee PROM JW JZ JW LQ  JW   Patellar mobs         STM  JW JZ- R post tib, soleus,gastroc     JW            Neuro Re-Ed 6/28 7/3 7/5 7/10  6/26   Quad sets      :05x10    TKE                                                      Ther Ex 6/28 7/3 7/5 7/10 7/12 6/26   Heel slides :10x10         gastroc stretch step  :30x3 ea :30x3 ea :30x3 ea     Soleus stretch incline  :30x3 ea :30x3 ea :30x3 ea     SAQ         LAQ 5# 3x10   5# 3x10  5# 3x10  5# 3x10  5# 3x10    SLR 3x10  2# 3x10  2# 3x10   3x10    bridge 3x10   U/l 2x10 ea  U/l 2x10 ea     Seated knee extension stretch          Standing hip abduction  S/l 3x10   S/l 3x10 ea  U/l 3x10  S/L 3x10 S/l 3x10    Mini squats         HR  3x10  U/l 3x10 ea U/l 3x10  U/l 3x10  U/l 3x10  3x10    Plantar fascia stretch standing   :30x3 ea                         Ther Activity 6/28 7/3 7/5 7/10 7/12 6/26 step ups      6" 2x10     step downs     2" x10  4" x10 1 HHA  2" x10   4" x10   1 HHA    Leg Press U/l 30/ x10   40/ x10   50/ x10    seat4 U/l   40/3x10 ea Seat 4   50/ 2x10   60/ x10  Seat 4   50/ 2x10   60/ x10  Seat 4 30 # U/L MHP 3x10    U/l calf press  30/ 3x10  30/ 3x10  30/ 3x10  30/ 3x10 MHP 3x10    STS 3x10     3x10    RB Lvl 0 5'  lvl0 3'  Lvl 1 5'  Lvl 1 5'  Lvl 1 5'  Lvl 0 5'

## 2023-07-17 ENCOUNTER — OFFICE VISIT (OUTPATIENT)
Dept: PHYSICAL THERAPY | Facility: CLINIC | Age: 58
End: 2023-07-17
Payer: COMMERCIAL

## 2023-07-17 DIAGNOSIS — G89.29 CHRONIC PAIN OF RIGHT KNEE: ICD-10-CM

## 2023-07-17 DIAGNOSIS — M25.561 CHRONIC PAIN OF RIGHT KNEE: ICD-10-CM

## 2023-07-17 DIAGNOSIS — Z96.651 STATUS POST RIGHT KNEE REPLACEMENT: ICD-10-CM

## 2023-07-17 DIAGNOSIS — R26.2 DIFFICULTY WALKING: Primary | ICD-10-CM

## 2023-07-17 DIAGNOSIS — M17.11 OSTEOARTHRITIS OF RIGHT KNEE, UNSPECIFIED OSTEOARTHRITIS TYPE: ICD-10-CM

## 2023-07-17 PROCEDURE — 97530 THERAPEUTIC ACTIVITIES: CPT

## 2023-07-17 PROCEDURE — 97110 THERAPEUTIC EXERCISES: CPT

## 2023-07-17 NOTE — PROGRESS NOTES
Daily Note     Today's date: 2023  Patient name: Kameron Mitchell  : 1965  MRN: [id number]  Referring provider: Nicole Terry MD  Dx:   Encounter Diagnosis     ICD-10-CM    1. Difficulty walking  R26.2       2. Chronic pain of right knee  M25.561     G89.29       3. Osteoarthritis of right knee, unspecified osteoarthritis type  M17.11       4. Status post right knee replacement  Z96.651                      Subjective: Cherry Burrows states "I can tell the knee is getting better". Pt notes stairs are still challenging at home, but overall seeing improvements. Objective: See treatment diary below      Assessment: Cherry Burrows tolerated PT treatment well. Passive knee stretching performed, ROM progressing approprietly at this time. Greatest challenge with 4" step down d/t limited eccentric quad control, hha x1 required. Resumed full weight with u/l leg press, pt completed w/o provocation. Continues to require tactile cues with s/l hip abduction to correct trunk compensation. Pt would benefit from continued PT to further address impairments and maximize functional level. Plan: Continue per plan of care. Precautions: IBS.        Manuals 6/28 7/3 7/5 7/10  7/17   Knee PROM JW JZ JW LQ  JW   Patellar mobs         STM  JW JZ- R post tib, soleus,gastroc                 Neuro Re-Ed 6/28 7/3 7/5 7/10  7/17   Quad sets         TKE                                                      Ther Ex 6/28 7/3 7/5 7/10 7/12 7/17   Heel slides :10x10         gastroc stretch step  :30x3 ea :30x3 ea :30x3 ea     Soleus stretch incline  :30x3 ea :30x3 ea :30x3 ea     SAQ         LAQ 5# 3x10   5# 3x10  5# 3x10  5# 3x10  5# 3x10    SLR 3x10  2# 3x10  2# 3x10   2#  3x10    bridge 3x10   U/l 2x10 ea  U/l 2x10 ea     Seated knee extension stretch          Standing hip abduction  S/l 3x10   S/l 3x10 ea  s/l 3x10  S/L 3x10 2# 3x10    Mini squats         HR  3x10  U/l 3x10 ea U/l 3x10  U/l 3x10  U/l 3x10  U/l 3x10    Plantar fascia stretch standing   :30x3 ea                         Ther Activity 6/28 7/3 7/5 7/10 7/12 7/17   step ups      8" 3x10     step downs     2" x10  4" x10 1 HHA  4" 2x10 1 hha    Leg Press U/l 30/ x10   40/ x10   50/ x10    seat4 U/l   40/3x10 ea Seat 4   50/ 2x10   60/ x10  Seat 4   50/ 2x10   60/ x10  Seat 4 30 # U/L MHP 3x10 Seat 4   50/ 2x10   60/ x10    U/l calf press  30/ 3x10  30/ 3x10  30/ 3x10  30/ 3x10 MHP 3x10 30/ 3x10    STS 3x10        RB Lvl 0 5'  lvl0 3'  Lvl 1 5'  Lvl 1 5'  Lvl 1 5'  Lvl 1 5'

## 2023-07-19 ENCOUNTER — OFFICE VISIT (OUTPATIENT)
Dept: PHYSICAL THERAPY | Facility: CLINIC | Age: 58
End: 2023-07-19
Payer: COMMERCIAL

## 2023-07-19 DIAGNOSIS — Z96.651 STATUS POST RIGHT KNEE REPLACEMENT: ICD-10-CM

## 2023-07-19 DIAGNOSIS — R26.2 DIFFICULTY WALKING: Primary | ICD-10-CM

## 2023-07-19 DIAGNOSIS — M25.561 CHRONIC PAIN OF RIGHT KNEE: ICD-10-CM

## 2023-07-19 DIAGNOSIS — G89.29 CHRONIC PAIN OF RIGHT KNEE: ICD-10-CM

## 2023-07-19 DIAGNOSIS — M17.11 OSTEOARTHRITIS OF RIGHT KNEE, UNSPECIFIED OSTEOARTHRITIS TYPE: ICD-10-CM

## 2023-07-19 PROCEDURE — 97110 THERAPEUTIC EXERCISES: CPT

## 2023-07-19 PROCEDURE — 97530 THERAPEUTIC ACTIVITIES: CPT

## 2023-07-19 NOTE — PROGRESS NOTES
Daily Note     Today's date: 2023  Patient name: Helmut Felty  : 1965  MRN: [id number]  Referring provider: Britni Hunt MD  Dx:   Encounter Diagnosis     ICD-10-CM    1. Difficulty walking  R26.2       2. Chronic pain of right knee  M25.561     G89.29       3. Osteoarthritis of right knee, unspecified osteoarthritis type  M17.11       4. Status post right knee replacement  Z96.651                      Subjective: Shad Baeza reports she has been riding her bike at home for 15 minutes daily, feels "pretty good". Objective: See treatment diary below      Assessment: Shad Baeza tolerated PT treatment well. Knee ROM is WFL at this time. Advance step height with forward step down, challenged with 6" height. She displays good technique throughout therapeutic exercises. Pt would benefit from continued PT to further address impairments and maximize functional level. Plan: Continue per plan of care. Precautions: IBS.        Manuals 7/19  7/5 7/10 7/12 7/17   Knee PROM JW  JW LQ  JW   Patellar mobs         STM                   Neuro Re-Ed 7/19  7/5 7/10  7/17   Quad sets         TKE                                                      Ther Ex 7/19  7/5 7/10 7/12 7/17   Heel slides         gastroc stretch step   :30x3 ea :30x3 ea     Soleus stretch incline   :30x3 ea :30x3 ea     SAQ         LAQ   5# 3x10  5# 3x10  5# 3x10  5# 3x10    SLR 2# VMO 3x10   2# 3x10  2# 3x10   2#  3x10    bridge U/l 3x10 ea   U/l 2x10 ea  U/l 2x10 ea     Seated knee extension stretch          Standing hip abduction  2# 3x10   S/l 3x10 ea  s/l 3x10  S/L 3x10 2# 3x10    Mini squats         HR  U/l 3x10   U/l 3x10  U/l 3x10  U/l 3x10  U/l 3x10    Plantar fascia stretch standing                            Ther Activity 7/19  7/5 7/10 7/12 7/17   step ups 8" 3x10      8" 3x10     step downs 4" x10   6" x10     2" x10  4" x10 1 HHA  4" 2x10 1 hha    Leg Press Seat 4   60/ 3x10   Seat 4   50/ 2x10   60/ x10  Seat 4   50/ 2x10 60/ x10  Seat 4 30 # U/L MHP 3x10 Seat 4   50/ 2x10   60/ x10    U/l calf press 30/ 3x10   30/ 3x10  30/ 3x10  30/ 3x10 MHP 3x10 30/ 3x10    STS         RB Lvl 2 5'   Lvl 1 5'  Lvl 1 5'  Lvl 1 5'  Lvl 1 5'

## 2023-07-24 ENCOUNTER — OFFICE VISIT (OUTPATIENT)
Dept: PHYSICAL THERAPY | Facility: CLINIC | Age: 58
End: 2023-07-24
Payer: COMMERCIAL

## 2023-07-24 DIAGNOSIS — G89.29 CHRONIC PAIN OF RIGHT KNEE: ICD-10-CM

## 2023-07-24 DIAGNOSIS — R26.2 DIFFICULTY WALKING: Primary | ICD-10-CM

## 2023-07-24 DIAGNOSIS — M17.11 OSTEOARTHRITIS OF RIGHT KNEE, UNSPECIFIED OSTEOARTHRITIS TYPE: ICD-10-CM

## 2023-07-24 DIAGNOSIS — M25.561 CHRONIC PAIN OF RIGHT KNEE: ICD-10-CM

## 2023-07-24 PROCEDURE — 97530 THERAPEUTIC ACTIVITIES: CPT

## 2023-07-24 PROCEDURE — 97110 THERAPEUTIC EXERCISES: CPT

## 2023-07-24 NOTE — PROGRESS NOTES
Daily Note     Today's date: 2023  Patient name: Bhanu Che  : 1965  MRN: [id number]  Referring provider: Priscila Duran MD  Dx:   Encounter Diagnosis     ICD-10-CM    1. Difficulty walking  R26.2       2. Chronic pain of right knee  M25.561     G89.29       3. Osteoarthritis of right knee, unspecified osteoarthritis type  M17.11                      Subjective: Mandy Alaniz reports she had follow up with Ortho, MD please with current states of R knee. Pt noting she is seeing improvements in ROM and strength, gradual.       Objective: See treatment diary below      Assessment: Mandy Alaniz tolerated PT treatment well. Passive knee stretching performed, pt measuring at about 114 degrees of passive flexion. Challenged with current exercise program. Knee pain reported with 6" forward step down, decreased to 4" w/o provocation. Limited eccentric quad control. Advanced weight on leg press. Fatigue evident post session. Updated HEP to reflect current strength deficits. Pt would benefit from continued PT to further address impairments and maximize functional level. Plan: Continue per plan of care. Precautions: IBS.        Manuals 7/19 7/24  7/10 7/12 7/17   Knee PROM JW JW  LQ  JW   Patellar mobs         STM   JW                Neuro Re-Ed 7/19 7/24  7/10  7/17   Quad sets         TKE                                                      Ther Ex 7/19 7/24  7/10 7/12 7/17   Heel slides         gastroc stretch step    :30x3 ea     Soleus stretch incline    :30x3 ea     SAQ         LAQ    5# 3x10  5# 3x10  5# 3x10    SLR 2# VMO 3x10  2# VMO 3x10   2# 3x10   2#  3x10    bridge U/l 3x10 ea  U/l 3x10 ea   U/l 2x10 ea     Seated knee extension stretch          Standing hip abduction  2# 3x10  2# 3x10   s/l 3x10  S/L 3x10 2# 3x10    Mini squats         HR  U/l 3x10  3x10   U/l 3x10  U/l 3x10  U/l 3x10    Plantar fascia stretch standing                            Ther Activity 7/19 7/24  7/10 7/12 7/17   step ups 8" 3x10  8" 3x10     8" 3x10     step downs 4" x10   6" x10  4" 2x10    2" x10  4" x10 1 HHA  4" 2x10 1 hha    Leg Press Seat 4   60/ 3x10  Seat 4   60/ x10   70/ 2x10   Seat 4   50/ 2x10   60/ x10  Seat 4 30 # U/L MHP 3x10 Seat 4   50/ 2x10   60/ x10    U/l calf press 30/ 3x10  30/ 3x10   30/ 3x10  30/ 3x10 MHP 3x10 30/ 3x10    STS  3x10        RB Lvl 2 5'  Lvl 2 5'   Lvl 1 5'  Lvl 1 5'  Lvl 1 5'

## 2023-07-26 ENCOUNTER — OFFICE VISIT (OUTPATIENT)
Dept: PHYSICAL THERAPY | Facility: CLINIC | Age: 58
End: 2023-07-26
Payer: COMMERCIAL

## 2023-07-26 DIAGNOSIS — M17.11 OSTEOARTHRITIS OF RIGHT KNEE, UNSPECIFIED OSTEOARTHRITIS TYPE: ICD-10-CM

## 2023-07-26 DIAGNOSIS — R26.2 DIFFICULTY WALKING: Primary | ICD-10-CM

## 2023-07-26 DIAGNOSIS — Z96.651 STATUS POST RIGHT KNEE REPLACEMENT: ICD-10-CM

## 2023-07-26 DIAGNOSIS — G89.29 CHRONIC PAIN OF RIGHT KNEE: ICD-10-CM

## 2023-07-26 DIAGNOSIS — M25.561 CHRONIC PAIN OF RIGHT KNEE: ICD-10-CM

## 2023-07-26 PROCEDURE — 97110 THERAPEUTIC EXERCISES: CPT

## 2023-07-26 PROCEDURE — 97530 THERAPEUTIC ACTIVITIES: CPT

## 2023-07-26 NOTE — PROGRESS NOTES
Daily Note     Today's date: 2023  Patient name: Radha Christopher  : 1965  MRN: [id number]  Referring provider: Deliah Apgar, MD  Dx:   Encounter Diagnosis     ICD-10-CM    1. Difficulty walking  R26.2       2. Chronic pain of right knee  M25.561     G89.29       3. Osteoarthritis of right knee, unspecified osteoarthritis type  M17.11       4. Status post right knee replacement  Z96.651                      Subjective: Flaco Tomlinson notes mild R knee soreness/tightness following last PT session. Continues to note improvement in ROM and strength and PT sessions progress. Objective: See treatment diary below      Assessment: Flaco Tomlinson tolerated PT treatment well. Knee ROM progressing appropriately, end range tightness into flexion remaining. Greatest challenge with forward step down d/t limited eccentric quad control. Advanced weight on leg press. Pt displays good technique throughout therapeutic exercises. Pt would benefit from continued PT to further address impairments and maximize functional level. Plan: Continue per plan of care. Precautions: IBS.        Manuals    Knee PROM JW JW JW   JW   Patellar mobs         STM   JW                Neuro Re-Ed    Quad sets         TKE                                                      Ther Ex    Heel slides         gastroc stretch step         Soleus stretch incline         SAQ         LAQ   10# 3x10   5# 3x10  5# 3x10    SLR 2# VMO 3x10  2# VMO 3x10  2# VMO 3x10    2#  3x10    bridge U/l 3x10 ea  U/l 3x10 ea  U/l 3x10 ea       Seated knee extension stretch          Standing hip abduction  2# 3x10  2# 3x10  2# 3x10   S/L 3x10 2# 3x10    Mini squats         HR  U/l 3x10  U/l 3x10  U/l 3x10   U/l 3x10  U/l 3x10    Plantar fascia stretch standing                            Ther Activity    step ups 8" 3x10  8" 3x10  8" 3x10    8" 3x10     step downs 4" x10   6" x10  4" 2x10  4" 3x10   2" x10  4" x10 1 HHA  4" 2x10 1 hha    Leg Press Seat 4   60/ 3x10  Seat 4   60/ x10   70/ 2x10  Seat 4   60/ x10   70/ 2x10   Seat 4 30 # U/L MHP 3x10 Seat 4   50/ 2x10   60/ x10    U/l calf press 30/ 3x10  30/ 3x10  30/ 3x10   30/ 3x10 MHP 3x10 30/ 3x10    STS  3x10        RB Lvl 2 5'  Lvl 2 5'  Lvl 2 5'   Lvl 1 5'  Lvl 1 5'

## 2023-07-31 ENCOUNTER — OFFICE VISIT (OUTPATIENT)
Dept: PHYSICAL THERAPY | Facility: CLINIC | Age: 58
End: 2023-07-31
Payer: COMMERCIAL

## 2023-07-31 DIAGNOSIS — Z96.651 STATUS POST RIGHT KNEE REPLACEMENT: Primary | ICD-10-CM

## 2023-07-31 DIAGNOSIS — G89.29 CHRONIC PAIN OF RIGHT KNEE: ICD-10-CM

## 2023-07-31 DIAGNOSIS — M17.11 OSTEOARTHRITIS OF RIGHT KNEE, UNSPECIFIED OSTEOARTHRITIS TYPE: ICD-10-CM

## 2023-07-31 DIAGNOSIS — M25.561 CHRONIC PAIN OF RIGHT KNEE: ICD-10-CM

## 2023-07-31 DIAGNOSIS — R26.2 DIFFICULTY WALKING: ICD-10-CM

## 2023-07-31 PROCEDURE — 97110 THERAPEUTIC EXERCISES: CPT | Performed by: PHYSICAL THERAPIST

## 2023-07-31 PROCEDURE — 97140 MANUAL THERAPY 1/> REGIONS: CPT | Performed by: PHYSICAL THERAPIST

## 2023-07-31 PROCEDURE — 97112 NEUROMUSCULAR REEDUCATION: CPT | Performed by: PHYSICAL THERAPIST

## 2023-07-31 NOTE — PROGRESS NOTES
Daily Note     Today's date: 2023  Patient name: Rebecca Randall  : 1965  MRN: [id number]  Referring provider: Carolyn Barros MD  Dx:   Encounter Diagnosis     ICD-10-CM    1. Status post right knee replacement  Z96.651       2. Difficulty walking  R26.2       3. Chronic pain of right knee  M25.561     G89.29       4. Osteoarthritis of right knee, unspecified osteoarthritis type  M17.11              Subjective: Pt reported that "I am doing okay today."     Objective: See treatment diary below    Assessment: Tolerated treatment well. Patient demonstrated fatigue post treatment, exhibited good technique with therapeutic exercises and would benefit from continued PT. Plan: Continue per plan of care. Precautions: IBS.        Manuals    Knee PROM JW JW JW JZ  JW   Patellar mobs         STM   JW                Neuro Re-Ed    Quad sets         TKE                                                      Ther Ex    Heel slides         gastroc stretch step         Soleus stretch incline         SAQ    MRE 3x10   :30x3      LAQ   10# 3x10  MRE 3x10   5# 3x10    SLR 2# VMO 3x10  2# VMO 3x10  2# VMO 3x10    2#  3x10    bridge U/l 3x10 ea  U/l 3x10 ea  U/l 3x10 ea       Seated knee extension stretch     :30x3      Standing hip abduction  2# 3x10  2# 3x10  2# 3x10    2# 3x10    Seated knee flexion    MRE 3x10      HR  U/l 3x10  U/l 3x10  U/l 3x10  U/l 3x10 ea  U/l 3x10    Plantar fascia stretch standing                            Ther Activity    step ups 8" 3x10  8" 3x10  8" 3x10    8" 3x10     step downs 4" x10   6" x10  4" 2x10  4" 3x10    4" 2x10 1 hha    Leg Press Seat 4   60/ 3x10  Seat 4   60/ x10   70/ 2x10  Seat 4   60/ x10   70/ 2x10  seat4   60/ 10x   70/ 2x10   Seat 4   50/ 2x10   60/ x10    U/l calf press 30/ 3x10  30/ 3x10  30/ 3x10  30/ 3x10  30/ 3x10    STS  3x10        RB Lvl 2 5'  Lvl 2 5'  Lvl 2 5'  lvl2 5'   Lvl 1 5'

## 2023-08-14 ENCOUNTER — APPOINTMENT (OUTPATIENT)
Dept: PHYSICAL THERAPY | Facility: CLINIC | Age: 58
End: 2023-08-14
Payer: COMMERCIAL

## 2023-08-21 ENCOUNTER — OFFICE VISIT (OUTPATIENT)
Dept: PHYSICAL THERAPY | Facility: CLINIC | Age: 58
End: 2023-08-21
Payer: COMMERCIAL

## 2023-08-21 DIAGNOSIS — R26.2 DIFFICULTY WALKING: ICD-10-CM

## 2023-08-21 DIAGNOSIS — G89.29 CHRONIC PAIN OF RIGHT KNEE: ICD-10-CM

## 2023-08-21 DIAGNOSIS — M17.11 OSTEOARTHRITIS OF RIGHT KNEE, UNSPECIFIED OSTEOARTHRITIS TYPE: ICD-10-CM

## 2023-08-21 DIAGNOSIS — Z96.651 STATUS POST RIGHT KNEE REPLACEMENT: Primary | ICD-10-CM

## 2023-08-21 DIAGNOSIS — M25.561 CHRONIC PAIN OF RIGHT KNEE: ICD-10-CM

## 2023-08-21 PROCEDURE — 97112 NEUROMUSCULAR REEDUCATION: CPT

## 2023-08-21 PROCEDURE — 97110 THERAPEUTIC EXERCISES: CPT

## 2023-08-21 NOTE — PROGRESS NOTES
Daily Note     Today's date: 2023  Patient name: Alejandro Kwok  : 1965  MRN: [id number]  Referring provider: Juan Harrison MD  Dx:   Encounter Diagnosis     ICD-10-CM    1. Status post right knee replacement  Z96.651       2. Difficulty walking  R26.2       3. Chronic pain of right knee  M25.561     G89.29       4. Osteoarthritis of right knee, unspecified osteoarthritis type  M17.11           Start Time: 1700  Stop Time: 1745  Total time in clinic (min): 45 minutes    Subjective: Patient states that she started biking again. She states that she had no pain with biking. She states she is ready to D/C this visit. Objective: See treatment diary below      Assessment: Tolerated treatment well. Good form with all activities with min cueing. Mild challenge continues with step downs but improved since the start of PT. Patient instructed to continue to stretch at home and focus on step down for better eccentric control. Patient is able to do her normal activities without limitations and would benefit from D/C to HEP. Plan: Pending DPT approval DC to HEP. Precautions: IBS.        Manuals     Knee PROM JW JW JW JZ     Patellar mobs         STM   JW                Neuro Re-Ed     Quad sets         TKE                                                      Ther Ex     Heel slides         gastroc stretch step         Soleus stretch incline         SAQ    MRE 3x10   :30x3  10# :30 x3    LAQ   10# 3x10  MRE 3x10  10# 3x10     SLR 2# VMO 3x10  2# VMO 3x10  2# VMO 3x10   2# VMO 3x10    bridge U/l 3x10 ea  U/l 3x10 ea  U/l 3x10 ea       Seated knee extension stretch     :30x3  HS stretch with pressure on knee for extension    Standing hip abduction  2# 3x10  2# 3x10  2# 3x10       Seated knee flexion    MRE 3x10  Standing 10x10"    HR  U/l 3x10  U/l 3x10  U/l 3x10  U/l 3x10 ea U/l 3x10 ea    Plantar fascia stretch standing Ther Activity 7/19 7/24 7/26 7/31 8/21    step ups 8" 3x10  8" 3x10  8" 3x10        step downs 4" x10   6" x10  4" 2x10  4" 3x10   6" 2x10    Leg Press Seat 4   60/ 3x10  Seat 4   60/ x10   70/ 2x10  Seat 4   60/ x10   70/ 2x10  seat4   60/ 10x   70/ 2x10  seat4   60/ 10x   70/ 2x10     U/l calf press 30/ 3x10  30/ 3x10  30/ 3x10  30/ 3x10 30/ 3x10    STS  3x10        RB Lvl 2 5'  Lvl 2 5'  Lvl 2 5'  lvl2 5'  lvl2 5'

## 2023-08-28 ENCOUNTER — APPOINTMENT (OUTPATIENT)
Dept: PHYSICAL THERAPY | Facility: CLINIC | Age: 58
End: 2023-08-28
Payer: COMMERCIAL

## 2024-05-22 ENCOUNTER — HOSPITAL ENCOUNTER (EMERGENCY)
Dept: HOSPITAL 99 - EMR | Age: 59
Discharge: HOME | End: 2024-05-22
Payer: COMMERCIAL

## 2024-05-22 VITALS — SYSTOLIC BLOOD PRESSURE: 159 MMHG | DIASTOLIC BLOOD PRESSURE: 70 MMHG | RESPIRATION RATE: 16 BRPM

## 2024-05-22 VITALS — RESPIRATION RATE: 17 BRPM | SYSTOLIC BLOOD PRESSURE: 142 MMHG | DIASTOLIC BLOOD PRESSURE: 83 MMHG

## 2024-05-22 VITALS — DIASTOLIC BLOOD PRESSURE: 77 MMHG | SYSTOLIC BLOOD PRESSURE: 148 MMHG | RESPIRATION RATE: 16 BRPM

## 2024-05-22 DIAGNOSIS — R10.9: Primary | ICD-10-CM

## 2024-05-22 LAB
ALBUMIN SERPL-MCNC: 4.4 G/DL (ref 3.5–5)
ALP SERPL-CCNC: 91 U/L (ref 38–126)
ALT SERPL-CCNC: 32 U/L (ref 0–35)
AST SERPL-CCNC: 32 U/L (ref 14–36)
BUN SERPL-MCNC: 14 MG/DL (ref 7–17)
CALCIUM SERPL-MCNC: 10 MG/DL (ref 8.4–10.2)
CHLORIDE SERPL-SCNC: 103 MMOL/L (ref 98–107)
CO2 SERPL-SCNC: 27 MMOL/L (ref 22–30)
EGFR: > 60
ERYTHROCYTE [DISTWIDTH] IN BLOOD BY AUTOMATED COUNT: 13.6 % (ref 11.5–14.5)
GLUCOSE SERPL-MCNC: 99 MG/DL (ref 70–99)
HCT VFR BLD AUTO: 40.6 % (ref 37–47)
HGB BLD-MCNC: 13.3 G/DL (ref 12–16)
MCHC RBC AUTO-ENTMCNC: 32.8 G/DL (ref 33–37)
MCV RBC AUTO: 89.8 FL (ref 81–99)
NRBC BLD AUTO-RTO: 0 %
PLATELET # BLD AUTO: 265 10^3/UL (ref 130–400)
POTASSIUM SERPL-SCNC: 4.5 MMOL/L (ref 3.5–5.1)
PROT SERPL-MCNC: 7.6 G/DL (ref 6.3–8.2)
SODIUM SERPL-SCNC: 138 MMOL/L (ref 135–145)

## 2024-05-22 PROCEDURE — 96374 THER/PROPH/DIAG INJ IV PUSH: CPT

## 2024-05-22 PROCEDURE — 96361 HYDRATE IV INFUSION ADD-ON: CPT

## 2024-05-22 PROCEDURE — 99284 EMERGENCY DEPT VISIT MOD MDM: CPT

## 2024-05-22 RX ADMIN — KETOROLAC TROMETHAMINE 15 MG: 15 INJECTION, SOLUTION INTRAMUSCULAR; INTRAVENOUS at 14:10

## 2024-05-22 RX ADMIN — SODIUM CHLORIDE 1000: 900 INJECTION, SOLUTION INTRAVENOUS at 14:10

## 2025-04-29 ENCOUNTER — OFFICE VISIT (OUTPATIENT)
Dept: PULMONOLOGY | Facility: CLINIC | Age: 60
End: 2025-04-29
Payer: COMMERCIAL

## 2025-04-29 VITALS
SYSTOLIC BLOOD PRESSURE: 130 MMHG | DIASTOLIC BLOOD PRESSURE: 78 MMHG | WEIGHT: 181 LBS | HEIGHT: 62 IN | HEART RATE: 70 BPM | BODY MASS INDEX: 33.31 KG/M2 | TEMPERATURE: 99.1 F | OXYGEN SATURATION: 98 %

## 2025-04-29 DIAGNOSIS — I10 PRIMARY HYPERTENSION: ICD-10-CM

## 2025-04-29 DIAGNOSIS — G47.19 EXCESSIVE DAYTIME SLEEPINESS: ICD-10-CM

## 2025-04-29 DIAGNOSIS — E66.811 CLASS 1 OBESITY DUE TO EXCESS CALORIES WITH BODY MASS INDEX (BMI) OF 31.0 TO 31.9 IN ADULT, UNSPECIFIED WHETHER SERIOUS COMORBIDITY PRESENT: ICD-10-CM

## 2025-04-29 DIAGNOSIS — G47.33 OSA (OBSTRUCTIVE SLEEP APNEA): Primary | ICD-10-CM

## 2025-04-29 DIAGNOSIS — E66.09 CLASS 1 OBESITY DUE TO EXCESS CALORIES WITH BODY MASS INDEX (BMI) OF 31.0 TO 31.9 IN ADULT, UNSPECIFIED WHETHER SERIOUS COMORBIDITY PRESENT: ICD-10-CM

## 2025-04-29 DIAGNOSIS — R06.83 SNORING: ICD-10-CM

## 2025-04-29 PROCEDURE — 99204 OFFICE O/P NEW MOD 45 MIN: CPT | Performed by: INTERNAL MEDICINE

## 2025-04-29 RX ORDER — MULTIVITAMIN
TABLET ORAL
COMMUNITY
Start: 2025-02-01

## 2025-04-29 RX ORDER — CALCIUM CARBONATE 260MG(650)
TABLET,CHEWABLE ORAL
COMMUNITY

## 2025-04-29 RX ORDER — TRIAMCINOLONE ACETONIDE 1 MG/G
CREAM TOPICAL
COMMUNITY
Start: 2025-02-07

## 2025-04-29 RX ORDER — VITAMIN B COMPLEX
TABLET ORAL
COMMUNITY
Start: 2010-01-06

## 2025-04-29 RX ORDER — CELECOXIB 100 MG/1
100 CAPSULE ORAL 2 TIMES DAILY
COMMUNITY
Start: 2025-02-01 | End: 2026-03-10

## 2025-04-29 RX ORDER — NEBIVOLOL 2.5 MG/1
5 TABLET ORAL DAILY
COMMUNITY

## 2025-04-29 RX ORDER — CETIRIZINE HYDROCHLORIDE 10 MG/1
TABLET ORAL AS NEEDED
COMMUNITY

## 2025-04-29 NOTE — ASSESSMENT & PLAN NOTE
She has history of hypertension and has been on treatment with Bystolic.  Currently her blood pressure is controlled.

## 2025-04-29 NOTE — ASSESSMENT & PLAN NOTE
Mrs.Teresa Nickerson has had a long history of loud snoring, apneas and waking up episodes during sleep.  She wakes up not refreshed and feels sleepy and tired during the day.  Her [place] sleepiness score is 17 out of 24.  On clinical examination, she was obese and had oropharyngeal crowding.  Mallampati class III.  She likely has significant obstructive sleep apnea and could benefit from CPAP therapy.  I have ordered a overnight home sleep study for further evaluation.  I have advised her regarding driving and use of sedating medications and alcohol.  I had a long discussion with her and have answered all her questions.  Orders:    Home Sleep Study; Future

## 2025-04-29 NOTE — PROGRESS NOTES
Name: Jessica Nickerson      : 1965      MRN: [id number]  Encounter Provider: Carolyn Dougherty MD  Encounter Date: 2025   Encounter department: [facility]  :  Assessment & Plan  SIXTO (obstructive sleep apnea)  Mrs.Teresa Nickerson has had a long history of loud snoring, apneas and waking up episodes during sleep.  She wakes up not refreshed and feels sleepy and tired during the day.  Her [place] sleepiness score is 17 out of 24.  On clinical examination, she was obese and had oropharyngeal crowding.  Mallampati class III.  She likely has significant obstructive sleep apnea and could benefit from CPAP therapy.  I have ordered a overnight home sleep study for further evaluation.  I have advised her regarding driving and use of sedating medications and alcohol.  I had a long discussion with her and have answered all her questions.  Orders:    Home Sleep Study; Future    Class 1 obesity due to excess calories with body mass index (BMI) of 31.0 to 31.9 in adult, unspecified whether serious comorbidity present  She is mildly obese and understands need for weight reduction.  She understands that weight reduction can significantly improve her sleep apnea and other symptoms.       Primary hypertension  She has history of hypertension and has been on treatment with Bystolic.  Currently her blood pressure is controlled.       Excessive daytime sleepiness  She has excessive daytime sleepiness and tiredness.  Her [place] sleepiness score is 17 out of 24.  I have advised her regarding driving and use of sedative medications and alcohol.  It is very likely that her excessive daytime sleepiness is secondary to her possible significant sleep apnea.  Orders:    Home Sleep Study; Future    Snoring  She has had loud snoring which wakes up her bed partner.  Orders:    Home Sleep Study; Future        History of Present Illness   General  Associated symptoms include arthralgias, fatigue,  headaches and neck pain. Pertinent negatives include no abdominal pain, chest pain, chills, coughing, fever, nausea, rash, sore throat or vomiting.     Jessica Ncikerson is a 59 y.o. female past medical history of hypertension and obesity who has been referred for evaluation for sleep breathing disorder.  She has a long history of snoring, apneas and waking up not refreshed.  She occasionally chokes during sleep.  She feels sleepy and tired during the day.  Her [place] sleepiness score was 17 out of 24.  She denied any major problem driving.  She has been on treatment with Bystolic for hypertension.  No history of diabetes.  She is obese and understands the need for weight reduction.  She denied any significant shortness of breath cough or phlegm or wheeze or chest pain.  She had a history of allergies and has been using antihistamine.  She understands the precautions while driving.  She currently does not smoke.  She consumes alcohol occasionally.  She stated  that her sleep is very much disturbed and she has been sleeping in a different room as her  could not sleep so the snoring.  Not been sleep tested before.  He denied any history of diabetes or heart problems or stroke.  Her sister has sleep apnea and uses CPAP.      Review of Systems   Constitutional:  Positive for fatigue. Negative for appetite change, chills and fever.   HENT:  Positive for rhinorrhea. Negative for hearing loss, sneezing, sore throat and trouble swallowing.    Respiratory:  Positive for apnea and choking. Negative for cough, shortness of breath and wheezing.    Cardiovascular:  Negative for chest pain, palpitations and leg swelling.   Gastrointestinal:  Negative for abdominal pain, constipation, diarrhea, nausea and vomiting.   Genitourinary:  Negative for dysuria, frequency and urgency.   Musculoskeletal:  Positive for arthralgias, back pain and neck pain. Negative for gait problem.   Skin:  Negative for rash.   Allergic/Immunologic:  "Positive for environmental allergies.   Neurological:  Positive for headaches. Negative for dizziness, seizures, syncope and light-headedness.   Psychiatric/Behavioral:  Positive for sleep disturbance. Negative for agitation and confusion. The patient is not nervous/anxious.           Objective   /78 (BP Location: Left arm, Patient Position: Sitting, Cuff Size: Standard)   Pulse 70   Temp 99.1 °F (37.3 °C) (Tympanic)   Ht 5' 2\" (1.575 m)   Wt 82.1 kg (181 lb)   SpO2 98%   BMI 33.11 kg/m²      Physical Exam  Vitals reviewed.   Constitutional:       General: She is not in acute distress.     Appearance: She is obese. She is not ill-appearing, toxic-appearing or diaphoretic.   HENT:      Head: Normocephalic.      Nose: Nose normal.      Mouth/Throat:      Mouth: Mucous membranes are moist.      Pharynx: Oropharynx is clear.      Comments: Oropharyngeal crowding Mallampati class III.  Eyes:      General: No scleral icterus.     Conjunctiva/sclera: Conjunctivae normal.   Cardiovascular:      Rate and Rhythm: Normal rate and regular rhythm.      Heart sounds: Normal heart sounds. No murmur heard.  Pulmonary:      Effort: Pulmonary effort is normal. No respiratory distress.      Breath sounds: Normal breath sounds. No stridor. No wheezing, rhonchi or rales.   Chest:      Chest wall: No tenderness.   Abdominal:      General: Bowel sounds are normal.      Palpations: Abdomen is soft.      Tenderness: There is no abdominal tenderness. There is no guarding.   Musculoskeletal:      Cervical back: Neck supple. No rigidity.      Right lower leg: No edema.      Left lower leg: No edema.   Lymphadenopathy:      Cervical: No cervical adenopathy.   Skin:     Coloration: Skin is not jaundiced or pale.      Findings: No rash.   Neurological:      Mental Status: She is alert and oriented to person, place, and time.      Gait: Gait normal.   Psychiatric:         Mood and Affect: Mood normal.         Behavior: Behavior normal. "         Thought Content: Thought content normal.         Judgment: Judgment normal.

## 2025-04-29 NOTE — ASSESSMENT & PLAN NOTE
She is mildly obese and understands need for weight reduction.  She understands that weight reduction can significantly improve her sleep apnea and other symptoms.

## 2025-04-29 NOTE — ASSESSMENT & PLAN NOTE
She has excessive daytime sleepiness and tiredness.  Her [place] sleepiness score is 17 out of 24.  I have advised her regarding driving and use of sedative medications and alcohol.  It is very likely that her excessive daytime sleepiness is secondary to her possible significant sleep apnea.  Orders:    Home Sleep Study; Future

## 2025-05-21 ENCOUNTER — HOSPITAL ENCOUNTER (OUTPATIENT)
Facility: HOSPITAL | Age: 60
Discharge: HOME | End: 2025-05-21
Attending: INTERNAL MEDICINE
Payer: COMMERCIAL

## 2025-05-21 DIAGNOSIS — G47.33 OSA (OBSTRUCTIVE SLEEP APNEA): ICD-10-CM

## 2025-05-21 DIAGNOSIS — G47.19 EXCESSIVE DAYTIME SLEEPINESS: ICD-10-CM

## 2025-05-21 DIAGNOSIS — R06.83 SNORING: ICD-10-CM

## 2025-05-21 PROCEDURE — G0399 HOME SLEEP TEST/TYPE 3 PORTA: HCPCS

## 2025-05-21 NOTE — PROGRESS NOTES
Home Sleep Study Documentation    HOME STUDY DEVICE: Noxturnal no                                           Luz Maria G3 yes device # 3      Pre-Sleep Home Study:    Set-up and instructions performed by: Chiquis    Technician performed demonstration for Patient: yes    Return demonstration performed by Patient: yes    Written instructions provided to Patient: yes    Patient signed consent form: yes        Post-Sleep Home Study:    Additional comments by Patient: None    Home Sleep Study Failed:no:    Failure reason: N/A    Reported or Detected: N/A    Scored by: LUCRECIA Reed

## 2025-05-28 ENCOUNTER — RESULTS FOLLOW-UP (OUTPATIENT)
Dept: PULMONOLOGY | Facility: CLINIC | Age: 60
End: 2025-05-28

## 2025-05-28 DIAGNOSIS — G47.33 OSA (OBSTRUCTIVE SLEEP APNEA): Primary | ICD-10-CM

## 2025-05-29 NOTE — TELEPHONE ENCOUNTER
Pt calling in regarding Dr. Dougherty message. Please call pt to discuss further options. Please call pt or send message MY Chart message with recommendations.

## 2025-05-30 NOTE — RESULT ENCOUNTER NOTE
Patient called back.  Spoke to her at length regarding sleep study results and various treatment options.  Want to try CPAP.  Auto CPAP will be ordered 5 to 15 cm of water using mask/interface of patient choice.  Advised to use the CPAP regularly and come for review to my office after using the CPAP for about 6 weeks.  Advised regarding driving and use of sedative medications and alcohol   Patient requests all Lab and Radiology Results on their Discharge Instructions

## 2025-06-08 LAB

## 2025-06-12 LAB

## 2025-06-20 LAB
